# Patient Record
Sex: FEMALE | Race: WHITE
[De-identification: names, ages, dates, MRNs, and addresses within clinical notes are randomized per-mention and may not be internally consistent; named-entity substitution may affect disease eponyms.]

---

## 2021-12-26 ENCOUNTER — NURSE TRIAGE (OUTPATIENT)
Dept: OTHER | Facility: CLINIC | Age: 26
End: 2021-12-26

## 2023-11-03 DIAGNOSIS — S46.911A STRAIN OF RIGHT SHOULDER, INITIAL ENCOUNTER: ICD-10-CM

## 2023-11-03 NOTE — PROGRESS NOTES
Patient called requesting order for Physical Therapy as discussed at previous office visit. Order placed. Will call patient to call and set this up.

## 2023-11-21 ENCOUNTER — APPOINTMENT (OUTPATIENT)
Dept: PHYSICAL THERAPY | Facility: CLINIC | Age: 28
End: 2023-11-21
Payer: COMMERCIAL

## 2023-12-11 ENCOUNTER — APPOINTMENT (OUTPATIENT)
Dept: PHYSICAL THERAPY | Facility: CLINIC | Age: 28
End: 2023-12-11
Payer: COMMERCIAL

## 2024-02-06 ENCOUNTER — APPOINTMENT (OUTPATIENT)
Dept: OBSTETRICS AND GYNECOLOGY | Facility: CLINIC | Age: 29
End: 2024-02-06
Payer: COMMERCIAL

## 2024-06-12 ENCOUNTER — APPOINTMENT (OUTPATIENT)
Dept: PRIMARY CARE | Facility: CLINIC | Age: 29
End: 2024-06-12
Payer: MEDICARE

## 2024-06-12 VITALS
DIASTOLIC BLOOD PRESSURE: 66 MMHG | SYSTOLIC BLOOD PRESSURE: 102 MMHG | HEART RATE: 89 BPM | OXYGEN SATURATION: 99 % | HEIGHT: 63 IN | BODY MASS INDEX: 22.15 KG/M2 | WEIGHT: 125 LBS

## 2024-06-12 DIAGNOSIS — S33.6XXA SPRAIN OF SACROILIAC JOINT, INITIAL ENCOUNTER: Primary | ICD-10-CM

## 2024-06-12 PROCEDURE — 99214 OFFICE O/P EST MOD 30 MIN: CPT | Performed by: FAMILY MEDICINE

## 2024-06-12 PROCEDURE — 1036F TOBACCO NON-USER: CPT | Performed by: FAMILY MEDICINE

## 2024-06-12 RX ORDER — NORELGESTROMIN AND ETHINYL ESTRADIOL 150; 35 UG/D; UG/D
PATCH TRANSDERMAL
COMMUNITY
Start: 2024-06-06

## 2024-06-12 NOTE — PROGRESS NOTES
"Subjective   Patient ID: Bhargavi Daniel is a 29 y.o. female who presents for Back Pain.    HPI Patient noticed pain in her back about 1.5 months ago after doing squats, dead lifting. Has tried Motrin, Tylenol with no relief. Rest does help.     Review of Systems   All other systems reviewed and are negative.      Objective   /66   Pulse 89   Ht 1.6 m (5' 3\")   Wt 56.7 kg (125 lb)   SpO2 99%   BMI 22.14 kg/m²     Physical Exam  Constitutional:       Appearance: Normal appearance.   HENT:      Mouth/Throat:      Mouth: Mucous membranes are moist.   Eyes:      Conjunctiva/sclera: Conjunctivae normal.   Cardiovascular:      Rate and Rhythm: Normal rate and regular rhythm.   Pulmonary:      Effort: Pulmonary effort is normal.      Breath sounds: Normal breath sounds.   Abdominal:      Palpations: Abdomen is soft.   Musculoskeletal:         General: Normal range of motion.   Skin:     General: Skin is warm and dry.   Neurological:      Mental Status: She is alert and oriented to person, place, and time.   Psychiatric:         Mood and Affect: Mood normal.         Assessment/Plan   Diagnoses and all orders for this visit:  Sprain of sacroiliac joint, initial encounter        - Reviewed: IT band stretch, Iliopsoas stretch, Hamstring stretch, piriformis stretch and external hip rotation stretch. 3-5 x per week. Will start with deadlifting using only the bar and then each weeks x 6 weeks add weight back as tolerated with the goal of reaching her previous 2-4 RM of 175 lbs.     "

## 2024-10-20 DIAGNOSIS — Z30.41 ENCOUNTER FOR SURVEILLANCE OF CONTRACEPTIVE PILLS: Primary | ICD-10-CM

## 2024-10-21 RX ORDER — NORELGESTROMIN AND ETHINYL ESTRADIOL 35; 150 UG/D; UG/D
1 PATCH TRANSDERMAL
Qty: 3 PATCH | Refills: 0 | Status: SHIPPED | OUTPATIENT
Start: 2024-10-27

## 2024-10-21 NOTE — TELEPHONE ENCOUNTER
Automatic electronic refill request from pharmacy.  Patient last seen: 11/21/22  Upcoming appointment scheduled: NONE  Refills pended for Dr. Conrad to review.

## 2024-10-30 PROBLEM — R63.5 WEIGHT GAIN: Status: ACTIVE | Noted: 2024-10-30

## 2024-10-30 PROBLEM — G89.29 CHRONIC INTRACTABLE HEADACHE: Status: ACTIVE | Noted: 2024-10-30

## 2024-10-30 PROBLEM — N94.19 FUNCTIONAL DYSPAREUNIA: Status: ACTIVE | Noted: 2024-10-30

## 2024-10-30 PROBLEM — L65.9 HAIR LOSS: Status: ACTIVE | Noted: 2024-10-30

## 2024-10-30 PROBLEM — D72.9 ABNORMAL WHITE BLOOD CELL COUNT: Status: ACTIVE | Noted: 2024-10-30

## 2024-10-30 PROBLEM — L70.0 CYSTIC ACNE: Status: ACTIVE | Noted: 2024-10-30

## 2024-10-30 PROBLEM — M62.89 PELVIC FLOOR DYSFUNCTION: Status: ACTIVE | Noted: 2024-10-30

## 2024-10-30 PROBLEM — R51.9 CHRONIC INTRACTABLE HEADACHE: Status: ACTIVE | Noted: 2024-10-30

## 2024-10-30 PROBLEM — R10.2 PELVIC PAIN IN FEMALE: Status: ACTIVE | Noted: 2024-10-30

## 2024-10-30 PROBLEM — H91.91 HEARING LOSS OF RIGHT EAR: Status: ACTIVE | Noted: 2024-10-30

## 2024-10-30 PROBLEM — M54.50 LUMBAR PAIN: Status: ACTIVE | Noted: 2024-10-30

## 2024-10-30 PROBLEM — N63.10 BREAST MASS, RIGHT: Status: ACTIVE | Noted: 2024-10-30

## 2024-10-30 PROBLEM — K12.0 APHTHOUS ULCER: Status: ACTIVE | Noted: 2024-10-30

## 2024-10-30 PROBLEM — R10.9 ABDOMINAL PAIN: Status: RESOLVED | Noted: 2024-10-30 | Resolved: 2024-10-30

## 2024-10-31 ENCOUNTER — OFFICE VISIT (OUTPATIENT)
Dept: URGENT CARE | Facility: CLINIC | Age: 29
End: 2024-10-31
Payer: MEDICARE

## 2024-10-31 VITALS
OXYGEN SATURATION: 98 % | SYSTOLIC BLOOD PRESSURE: 113 MMHG | TEMPERATURE: 98.2 F | BODY MASS INDEX: 23.38 KG/M2 | WEIGHT: 132 LBS | DIASTOLIC BLOOD PRESSURE: 79 MMHG | HEART RATE: 83 BPM

## 2024-10-31 DIAGNOSIS — L23.9 ALLERGIC CONTACT DERMATITIS, UNSPECIFIED TRIGGER: Primary | ICD-10-CM

## 2024-10-31 PROCEDURE — 99203 OFFICE O/P NEW LOW 30 MIN: CPT | Performed by: PHYSICIAN ASSISTANT

## 2024-10-31 RX ORDER — METHYLPREDNISOLONE 4 MG/1
TABLET ORAL
Qty: 21 TABLET | Refills: 0 | Status: SHIPPED | OUTPATIENT
Start: 2024-10-31

## 2024-10-31 RX ORDER — HYDROCORTISONE 1 %
CREAM (GRAM) TOPICAL 2 TIMES DAILY
COMMUNITY

## 2024-11-15 DIAGNOSIS — Z30.41 ENCOUNTER FOR SURVEILLANCE OF CONTRACEPTIVE PILLS: ICD-10-CM

## 2024-11-15 RX ORDER — NORELGESTROMIN AND ETHINYL ESTRADIOL 35; 150 UG/D; UG/D
1 PATCH TRANSDERMAL
Qty: 3 PATCH | Refills: 0 | Status: SHIPPED | OUTPATIENT
Start: 2024-11-17

## 2024-11-29 ENCOUNTER — OFFICE VISIT (OUTPATIENT)
Dept: URGENT CARE | Facility: CLINIC | Age: 29
End: 2024-11-29
Payer: COMMERCIAL

## 2024-11-29 VITALS
BODY MASS INDEX: 23.1 KG/M2 | SYSTOLIC BLOOD PRESSURE: 117 MMHG | WEIGHT: 130.4 LBS | DIASTOLIC BLOOD PRESSURE: 84 MMHG | TEMPERATURE: 98 F | HEART RATE: 78 BPM | OXYGEN SATURATION: 98 %

## 2024-11-29 DIAGNOSIS — S39.012A STRAIN OF LUMBAR REGION, INITIAL ENCOUNTER: Primary | ICD-10-CM

## 2024-11-29 PROBLEM — F17.200 NICOTINE DEPENDENCE, UNSPECIFIED, UNCOMPLICATED: Status: ACTIVE | Noted: 2018-07-16

## 2024-11-29 PROBLEM — S01.01XA LACERATION WITHOUT FOREIGN BODY OF SCALP, INITIAL ENCOUNTER: Status: RESOLVED | Noted: 2018-04-27 | Resolved: 2024-11-29

## 2024-11-29 PROBLEM — R63.5 WEIGHT GAIN: Status: RESOLVED | Noted: 2024-11-29 | Resolved: 2024-11-29

## 2024-11-29 PROBLEM — N94.10 DYSPAREUNIA IN FEMALE: Status: ACTIVE | Noted: 2024-11-29

## 2024-11-29 PROBLEM — L03.119 CELLULITIS OF LOWER EXTREMITY: Status: RESOLVED | Noted: 2024-11-29 | Resolved: 2024-11-29

## 2024-11-29 PROBLEM — S46.919A STRAIN OF SHOULDER: Status: RESOLVED | Noted: 2024-11-29 | Resolved: 2024-11-29

## 2024-11-29 PROBLEM — F41.9 ANXIETY: Status: ACTIVE | Noted: 2024-11-29

## 2024-11-29 RX ORDER — PREDNISONE 10 MG/1
TABLET ORAL
Qty: 13 TABLET | Refills: 0 | Status: SHIPPED | OUTPATIENT
Start: 2024-11-29 | End: 2024-12-07

## 2024-11-29 RX ORDER — KETOROLAC TROMETHAMINE 30 MG/ML
60 INJECTION, SOLUTION INTRAMUSCULAR; INTRAVENOUS ONCE
Status: COMPLETED | OUTPATIENT
Start: 2024-11-29 | End: 2024-11-29

## 2024-11-29 RX ORDER — LIDOCAINE 50 MG/G
1 PATCH TOPICAL DAILY
COMMUNITY

## 2024-11-29 RX ORDER — TIZANIDINE 4 MG/1
4 TABLET ORAL EVERY 8 HOURS PRN
Qty: 21 TABLET | Refills: 0 | Status: SHIPPED | OUTPATIENT
Start: 2024-11-29 | End: 2024-12-06

## 2024-11-29 ASSESSMENT — ENCOUNTER SYMPTOMS
NUMBNESS: 0
FATIGUE: 0
ARTHRALGIAS: 0
DYSURIA: 0
NECK PAIN: 0
NAUSEA: 0
WEAKNESS: 0
COUGH: 0
TINGLING: 0
FACIAL SWELLING: 0
PERIANAL NUMBNESS: 0
EYE REDNESS: 0
NECK STIFFNESS: 0
HEADACHES: 0
RESPIRATORY NEGATIVE: 1
ABDOMINAL PAIN: 0
DIARRHEA: 0
SORE THROAT: 0
CHILLS: 0
LEG PAIN: 0
BOWEL INCONTINENCE: 0
PALPITATIONS: 0
RHINORRHEA: 0
VOMITING: 0
PARESIS: 0
VOICE CHANGE: 0
GASTROINTESTINAL NEGATIVE: 1
DIAPHORESIS: 0
WOUND: 0
MYALGIAS: 1
CARDIOVASCULAR NEGATIVE: 1
BACK PAIN: 1
JOINT SWELLING: 0
PARESTHESIAS: 0
DIZZINESS: 0
TROUBLE SWALLOWING: 0
NEUROLOGICAL NEGATIVE: 1
COLOR CHANGE: 0
CONSTITUTIONAL NEGATIVE: 1
FEVER: 0
SHORTNESS OF BREATH: 0

## 2024-11-29 ASSESSMENT — VISUAL ACUITY: OU: 1

## 2024-11-29 NOTE — PROGRESS NOTES
Subjective   History  Bhargavi Daniel is a 29 y.o. female who presents for Back Pain.    Patient presents with low back pain for the last 2 days after twisting and grabbing a hair brush out of her sons hand. She reports pain is much worse with certain movements now. Denies any numbness/ tingling or radiating pain down legs, reports previous SI joint issues. reports often lifting weights and strength training. She reports trying ibuprofen, lidocaine patches, warmth, and the chiropractor without relief.      History provided by:  Patient and medical records  Back Pain  This is a new problem. The problem has been gradually worsening since onset. The pain is present in the sacro-iliac and lumbar spine. The quality of the pain is described as aching and stabbing. The pain does not radiate. The symptoms are aggravated by position and twisting. Pertinent negatives include no abdominal pain, bladder incontinence, bowel incontinence, chest pain, dysuria, fever, headaches, leg pain, numbness, paresis, paresthesias, pelvic pain, perianal numbness, tingling or weakness.     Past Surgical History:   Procedure Laterality Date    OTHER SURGICAL HISTORY  03/07/2019    Dilation and curettage    OTHER SURGICAL HISTORY  06/03/2022    Sebastian tooth extraction     Social History     Tobacco Use    Smoking status: Never    Smokeless tobacco: Never   Vaping Use    Vaping status: Never Used   Substance Use Topics    Alcohol use: Yes    Drug use: Not Currently       Review of Systems   Review of Systems   Constitutional: Negative.  Negative for chills, diaphoresis, fatigue and fever.   HENT: Negative.  Negative for congestion, facial swelling, rhinorrhea, sore throat, trouble swallowing and voice change.    Eyes:  Negative for redness.   Respiratory: Negative.  Negative for cough and shortness of breath.    Cardiovascular: Negative.  Negative for chest pain and palpitations.   Gastrointestinal: Negative.  Negative for abdominal pain, bowel  incontinence, diarrhea, nausea and vomiting.   Genitourinary:  Negative for bladder incontinence, dysuria and pelvic pain.   Musculoskeletal:  Positive for back pain and myalgias. Negative for arthralgias, gait problem, joint swelling, neck pain and neck stiffness.   Skin: Negative.  Negative for color change, rash and wound.   Neurological: Negative.  Negative for dizziness, tingling, weakness, numbness, headaches and paresthesias.       Objective   Vital Signs  /84   Pulse 78   Temp 36.7 °C (98 °F)   Wt 59.1 kg (130 lb 6.4 oz)   SpO2 98%   BMI 23.10 kg/m²    All vitals have been reviewed and are stable.     Physical Exam  Physical Exam  Vitals and nursing note reviewed.   Constitutional:       General: She is awake. She is not in acute distress.     Appearance: Normal appearance. She is well-developed. She is not ill-appearing, toxic-appearing or diaphoretic.   HENT:      Head: Normocephalic and atraumatic. No right periorbital erythema or left periorbital erythema.      Jaw: There is normal jaw occlusion.      Right Ear: External ear normal.      Left Ear: External ear normal.      Nose: Nose normal. No congestion or rhinorrhea.      Mouth/Throat:      Lips: Pink. No lesions.      Mouth: Mucous membranes are moist. No oral lesions or angioedema.      Pharynx: Oropharynx is clear.   Eyes:      General: Lids are normal. Vision grossly intact. Gaze aligned appropriately.      Extraocular Movements: Extraocular movements intact.      Conjunctiva/sclera: Conjunctivae normal.      Right eye: Right conjunctiva is not injected.      Left eye: Left conjunctiva is not injected.      Pupils: Pupils are equal, round, and reactive to light.   Cardiovascular:      Rate and Rhythm: Normal rate and regular rhythm.   Pulmonary:      Effort: Pulmonary effort is normal. No tachypnea or respiratory distress.      Breath sounds: Normal air entry. No stridor. No wheezing.   Abdominal:      General: Abdomen is flat. There is  no distension.      Palpations: Abdomen is soft.   Musculoskeletal:         General: No swelling or deformity.      Cervical back: Normal, full passive range of motion without pain, normal range of motion and neck supple.      Thoracic back: Normal. No bony tenderness. Normal range of motion.      Lumbar back: Spasms and tenderness present. No swelling or bony tenderness. Normal range of motion.   Skin:     General: Skin is warm and dry.      Findings: No erythema or rash.   Neurological:      General: No focal deficit present.      Mental Status: She is alert and oriented to person, place, and time. Mental status is at baseline.      Motor: Motor function is intact.      Coordination: Coordination is intact.   Psychiatric:         Mood and Affect: Mood and affect normal.         Speech: Speech normal.         Behavior: Behavior normal. Behavior is cooperative.         Thought Content: Thought content normal.         Judgment: Judgment normal.         Diagnostic Results   No results found for this or any previous visit (from the past 48 hours).    Assessment/Plan   Procedures   N/A    Problem List Items Addressed This Visit    None  Visit Diagnoses       Strain of lumbar region, initial encounter    -  Primary    Relevant Medications    tiZANidine (Zanaflex) 4 mg tablet    predniSONE (Deltasone) 10 mg tablet    ketorolac (Toradol) injection 60 mg (Completed)            UC Course  Patient disposition: Home    Red flags for reporting to ER have been reviewed with the patient.    Current diagnosis, any medication changes, and all in-office lab or radiologic results have been reviewed with the patient at the time of the visit.   If symptoms do not improve or worsen, patient is to follow up with PCP or report to the emergency room.   Patient is alert and oriented x3 and non-toxic appearing. Vital signs are stable.   Patient and/or guardian has sufficient decision-making capabilities at this time and reports understanding  and agreement with the treatment plan made through shared decision-making.

## 2024-11-29 NOTE — PATIENT INSTRUCTIONS
BACK PAIN / MUSCLE SPASM    - TORADOL injection was provided in office for more rapid pain/inflammation relief  - TIZANIDINE is a muscle relaxer that helps reduce pain-inducing spasms  - PREDNISONE (steroid) has been prescribed to reduce nerve inflammation and pain    - Resting the affected muscle is critical to healing  - NSAIDs (Ibuprofen or naproxen) may be taken every 4-6 hours for pain relief and decreased inflammation  - Use Ice (or other cold object) for at least 6 hours after the injury. Some people find it helpful to ice up to 2 days after an injury. Use cold gel pack, bag of ice, or bag of frozen vegetables on the painful muscle for 15 min, every 1 to 2 hours.  Put a thin towel between the ice (or other cold object) and your skin.  - Alternate icing and heating if possible with heating pads or warm water  - Muscle strains often take 3-6 weeks to heal, decrease activity until pain free  - Gentle stretching without weight bearing can help prevent immobility while activity is decreased  - Yoga or seeking out chiropractor care may be beneficial  - If loss of bladder or bowel control, numbness of groin region, numbness or weakness of lower extremities occurs, go to the ER immediately  Follow up with PCP in 4-6 weeks, if you need a referral, please call our office.     Steroids are strong medications that mimic the body's natural production of cortisol and is prescribed to reduce inflammation especially when pain is caused by inflammation. Short-term side effects of steroids reviewed, including gastritis, insomnia, moodiness, acne, fluid retention (leg swelling) and potential for increase in blood pressure or sugar. If you have have a history of hypertension or diabetes, you should monitor at home regularly while on this medication and discontinue if levels are high (BP>160/90 or glucose >200). Steroids are often prescribed to be tapered off into lower doses after prolonged use to avoid withdrawal symptoms such  as fatigue, weakness, body aches, nausea, and lightheadedness.  However, if a severe reaction is noted (including rash or difficulty breathing) discontinue and seek emergency care immediately.

## 2024-12-13 ENCOUNTER — TELEPHONE (OUTPATIENT)
Dept: OBSTETRICS AND GYNECOLOGY | Facility: CLINIC | Age: 29
End: 2024-12-13
Payer: MEDICARE

## 2024-12-13 DIAGNOSIS — Z30.41 ENCOUNTER FOR SURVEILLANCE OF CONTRACEPTIVE PILLS: ICD-10-CM

## 2024-12-13 RX ORDER — NORELGESTROMIN AND ETHINYL ESTRADIOL 35; 150 UG/D; UG/D
1 PATCH TRANSDERMAL
Qty: 3 PATCH | Refills: 0 | OUTPATIENT
Start: 2024-12-13

## 2024-12-16 RX ORDER — NORELGESTROMIN AND ETHINYL ESTRADIOL 35; 150 UG/MG; UG/MG
1 PATCH TRANSDERMAL
Qty: 3 PATCH | Refills: 3 | Status: SHIPPED | OUTPATIENT
Start: 2024-12-22

## 2025-01-21 ENCOUNTER — TELEPHONE (OUTPATIENT)
Dept: OBSTETRICS AND GYNECOLOGY | Facility: CLINIC | Age: 30
End: 2025-01-21

## 2025-01-21 DIAGNOSIS — Z30.41 ENCOUNTER FOR SURVEILLANCE OF CONTRACEPTIVE PILLS: ICD-10-CM

## 2025-01-21 NOTE — TELEPHONE ENCOUNTER
Patient of Dr. Conrad needs a refill of her birth control patch sent to the [facility]. Her next annual is 2/14 but will need a refill before then. Thank you!

## 2025-01-22 RX ORDER — NORELGESTROMIN AND ETHINYL ESTRADIOL 35; 150 UG/MG; UG/MG
1 PATCH TRANSDERMAL
Qty: 3 PATCH | Refills: 3 | Status: SHIPPED | OUTPATIENT
Start: 2025-01-26

## 2025-02-05 ENCOUNTER — OFFICE VISIT (OUTPATIENT)
Dept: URGENT CARE | Facility: CLINIC | Age: 30
End: 2025-02-05
Payer: COMMERCIAL

## 2025-02-05 VITALS
SYSTOLIC BLOOD PRESSURE: 133 MMHG | TEMPERATURE: 98.9 F | HEART RATE: 71 BPM | BODY MASS INDEX: 23.38 KG/M2 | OXYGEN SATURATION: 97 % | DIASTOLIC BLOOD PRESSURE: 86 MMHG | WEIGHT: 132 LBS

## 2025-02-05 DIAGNOSIS — J02.9 SORE THROAT: ICD-10-CM

## 2025-02-05 DIAGNOSIS — J01.10 ACUTE NON-RECURRENT FRONTAL SINUSITIS: Primary | ICD-10-CM

## 2025-02-05 LAB — POC RAPID STREP: NEGATIVE

## 2025-02-05 PROCEDURE — 87880 STREP A ASSAY W/OPTIC: CPT | Performed by: PHYSICIAN ASSISTANT

## 2025-02-05 PROCEDURE — 99213 OFFICE O/P EST LOW 20 MIN: CPT | Performed by: PHYSICIAN ASSISTANT

## 2025-02-05 RX ORDER — METHYLPREDNISOLONE 4 MG/1
TABLET ORAL
Qty: 21 TABLET | Refills: 0 | Status: SHIPPED | OUTPATIENT
Start: 2025-02-05

## 2025-02-05 RX ORDER — AZITHROMYCIN 250 MG/1
TABLET, FILM COATED ORAL
Qty: 6 TABLET | Refills: 0 | Status: SHIPPED | OUTPATIENT
Start: 2025-02-05

## 2025-02-05 NOTE — PROGRESS NOTES
Subjective   Patient ID: Bhargavi Daniel is a 29 y.o. female.    Pt c/o 4-5 days of sore throat and ear pain. Started out like a little sore throat and then had chills, but never had a fever. Then the sore throat got worse and ears started hurting, then she got a runny nose and a cough. She has been using sore throat spray, lozenges, DayQuil/NyQuil. She has been been having a sinus pressure. Sx are overall getting worse. Son was sick with similar Sx, but only for a couple of days then was better.         Review of Systems   All other systems reviewed and are negative.      Objective     Physical Exam  Vitals reviewed.   Constitutional:       General: She is awake.      Appearance: Normal appearance. She is well-developed.   HENT:      Head: Normocephalic and atraumatic.      Right Ear: Ear canal normal. A middle ear effusion is present. Tympanic membrane is injected. Tympanic membrane is not erythematous or bulging.      Left Ear: Ear canal normal. A middle ear effusion is present.      Nose: Congestion present.      Mouth/Throat:      Lips: Pink.      Mouth: Mucous membranes are moist.   Cardiovascular:      Rate and Rhythm: Normal rate and regular rhythm.   Pulmonary:      Effort: Pulmonary effort is normal.      Breath sounds: Normal breath sounds.   Musculoskeletal:      Cervical back: Full passive range of motion without pain.      Right lower leg: No edema.      Left lower leg: No edema.   Skin:     General: Skin is warm and dry.      Findings: No lesion or rash.   Neurological:      General: No focal deficit present.      Mental Status: She is alert and oriented to person, place, and time.      Cranial Nerves: No facial asymmetry.      Motor: Motor function is intact.      Gait: Gait is intact.   Psychiatric:         Attention and Perception: Attention normal.         Mood and Affect: Mood and affect normal.       Assessment/Plan   Problem List Items Addressed This Visit    None  Visit Diagnoses          Codes    Acute non-recurrent frontal sinusitis    -  Primary J01.10    Relevant Medications    azithromycin (Zithromax) 250 mg tablet    Sore throat     J02.9    Relevant Medications    methylPREDNISolone (Medrol Dospak) 4 mg tablets    Other Relevant Orders    POCT rapid strep A manually resulted (Completed)          Negative POCT strep - agree based on examination findings  Right TM somewhat injected  Sx worsening - likely due to sinusitis  Medication Rx as above  Increase rest and fluids     Red flag symptoms reviewed with patient and all questions answered. Patient or parent/guardian verbalized understanding and agreement with care plan as above. All in office testing reviewed with patient. If symptoms worsen or do not improve, patient is to follow up with PCP or report to the ER.    Patient disposition: Home

## 2025-02-14 ENCOUNTER — APPOINTMENT (OUTPATIENT)
Dept: OBSTETRICS AND GYNECOLOGY | Facility: CLINIC | Age: 30
End: 2025-02-14
Payer: MEDICARE

## 2025-02-14 VITALS — SYSTOLIC BLOOD PRESSURE: 122 MMHG | WEIGHT: 130 LBS | DIASTOLIC BLOOD PRESSURE: 78 MMHG | BODY MASS INDEX: 23.03 KG/M2

## 2025-02-14 DIAGNOSIS — Z12.4 SCREENING FOR MALIGNANT NEOPLASM OF CERVIX: ICD-10-CM

## 2025-02-14 DIAGNOSIS — Z11.3 SCREEN FOR SEXUALLY TRANSMITTED DISEASES: Primary | ICD-10-CM

## 2025-02-14 DIAGNOSIS — Z30.41 ENCOUNTER FOR SURVEILLANCE OF CONTRACEPTIVE PILLS: ICD-10-CM

## 2025-02-14 DIAGNOSIS — Z01.419 ENCOUNTER FOR ANNUAL ROUTINE GYNECOLOGICAL EXAMINATION: ICD-10-CM

## 2025-02-14 PROCEDURE — 1036F TOBACCO NON-USER: CPT | Performed by: OBSTETRICS & GYNECOLOGY

## 2025-02-14 PROCEDURE — 99385 PREV VISIT NEW AGE 18-39: CPT | Performed by: OBSTETRICS & GYNECOLOGY

## 2025-02-14 RX ORDER — NORELGESTROMIN AND ETHINYL ESTRADIOL 35; 150 UG/MG; UG/MG
1 PATCH TRANSDERMAL
Qty: 3 PATCH | Refills: 3 | Status: SHIPPED | OUTPATIENT
Start: 2025-02-16

## 2025-02-14 NOTE — PROGRESS NOTES
Subjective   Patient ID: Bhargavi Daniel is a 29 y.o. female who presents for Annual Exam.  VALENTE De La O is a 29-year-old G2, P2 on OCP presents for yearly exam.  She denies any concern.  She is sexually active no change in sexual partner but agrees to STD screening.    Review of Systems   All other systems reviewed and are negative.      Objective   Physical Exam  Vitals reviewed.   Constitutional:       Appearance: Normal appearance.   HENT:      Head: Normocephalic and atraumatic.      Nose: Nose normal.   Cardiovascular:      Rate and Rhythm: Normal rate and regular rhythm.   Pulmonary:      Effort: Pulmonary effort is normal.      Breath sounds: Normal breath sounds.   Chest:      Chest wall: No mass.   Breasts:     Right: Normal.      Left: Normal.   Abdominal:      General: Abdomen is flat. Bowel sounds are normal. There is no distension.      Palpations: Abdomen is soft. There is no mass.   Genitourinary:     General: Normal vulva.      Vagina: Normal.      Cervix: Normal.      Uterus: Normal.       Adnexa: Right adnexa normal and left adnexa normal.      Rectum: Normal.   Musculoskeletal:         General: Normal range of motion.      Cervical back: Normal range of motion.   Skin:     General: Skin is warm and dry.   Neurological:      General: No focal deficit present.      Mental Status: She is alert.   Psychiatric:         Mood and Affect: Mood normal.         Behavior: Behavior normal.         Assessment/Plan   Problem List Items Addressed This Visit    None  Visit Diagnoses         Codes    Screen for sexually transmitted diseases    -  Primary Z11.3    Relevant Orders    C. trachomatis / N. gonorrhoeae, Amplified, Urogenital    Screening for malignant neoplasm of cervix     Z12.4    Encounter for surveillance of contraceptive pills     Z30.41    Relevant Medications    norelgestromin-ethin.estradioL (Zafemy) 150-35 mcg/24 hr (Start on 2/16/2025)    Encounter for annual routine gynecological examination      Z01.419        She is HPV negative with history of negative Pap smear therefore Pap can be done every 3 to 5 years and patient agrees to it.  STD screening was done today.  The refill of her OCP was E scribed.  Have encouraged her to do self breast exam monthly eat healthy and exercise regularly follow-up as needed or in 1 to 2 years.         Toño Conrad MD 02/14/25 1:04 PM

## 2025-02-15 LAB
C TRACH RRNA SPEC QL NAA+PROBE: NOT DETECTED
N GONORRHOEA RRNA SPEC QL NAA+PROBE: NOT DETECTED
QUEST GC CT AMPLIFIED (ALWAYS MESSAGE): NORMAL

## 2025-03-11 PROBLEM — N94.10 DYSPAREUNIA, FEMALE: Status: ACTIVE | Noted: 2025-02-14

## 2025-03-11 PROBLEM — N80.9 ENDOMETRIOSIS: Status: ACTIVE | Noted: 2025-02-14

## 2025-08-11 ENCOUNTER — APPOINTMENT (OUTPATIENT)
Dept: OBSTETRICS AND GYNECOLOGY | Facility: CLINIC | Age: 30
End: 2025-08-11
Payer: COMMERCIAL

## 2025-09-23 ENCOUNTER — APPOINTMENT (OUTPATIENT)
Dept: OBSTETRICS AND GYNECOLOGY | Facility: CLINIC | Age: 30
End: 2025-09-23
Payer: COMMERCIAL

## 2025-11-03 ENCOUNTER — APPOINTMENT (OUTPATIENT)
Dept: OBSTETRICS AND GYNECOLOGY | Facility: CLINIC | Age: 30
End: 2025-11-03
Payer: COMMERCIAL

## 2025-11-24 ENCOUNTER — APPOINTMENT (OUTPATIENT)
Dept: OBSTETRICS AND GYNECOLOGY | Facility: CLINIC | Age: 30
End: 2025-11-24
Payer: COMMERCIAL